# Patient Record
Sex: FEMALE | HISPANIC OR LATINO | Employment: FULL TIME | URBAN - NONMETROPOLITAN AREA
[De-identification: names, ages, dates, MRNs, and addresses within clinical notes are randomized per-mention and may not be internally consistent; named-entity substitution may affect disease eponyms.]

---

## 2017-01-05 ENCOUNTER — OFFICE VISIT (OUTPATIENT)
Dept: OTOLARYNGOLOGY | Facility: CLINIC | Age: 27
End: 2017-01-05

## 2017-01-05 VITALS
HEIGHT: 63 IN | DIASTOLIC BLOOD PRESSURE: 63 MMHG | TEMPERATURE: 98 F | RESPIRATION RATE: 20 BRPM | SYSTOLIC BLOOD PRESSURE: 145 MMHG | HEART RATE: 77 BPM | BODY MASS INDEX: 19.84 KG/M2 | WEIGHT: 112 LBS

## 2017-01-05 DIAGNOSIS — H69.83 ETD (EUSTACHIAN TUBE DYSFUNCTION), BILATERAL: Primary | ICD-10-CM

## 2017-01-05 DIAGNOSIS — J30.9 ALLERGIC RHINITIS, UNSPECIFIED ALLERGIC RHINITIS TRIGGER, UNSPECIFIED RHINITIS SEASONALITY: ICD-10-CM

## 2017-01-05 PROCEDURE — 92567 TYMPANOMETRY: CPT | Performed by: NURSE PRACTITIONER

## 2017-01-05 PROCEDURE — 99214 OFFICE O/P EST MOD 30 MIN: CPT | Performed by: NURSE PRACTITIONER

## 2017-01-05 RX ORDER — AZELASTINE 1 MG/ML
2 SPRAY, METERED NASAL 2 TIMES DAILY
COMMUNITY

## 2017-01-05 RX ORDER — FLUTICASONE PROPIONATE 50 MCG
2 SPRAY, SUSPENSION (ML) NASAL DAILY
COMMUNITY

## 2017-01-05 NOTE — PROGRESS NOTES
PRIMARY CARE PROVIDER: CORBY Hall  REFERRING PROVIDER: No ref. provider found    Chief Complaint   Patient presents with   • Ear Problem     ear fullness, pressure       Subjective   History of Present Illness:  Maura Wang is a  26 y.o.  female who is here for follow-up from complaints of ear fullness, ear pressure, popping and cracking of the ear, fluid on the ear, a history of ear tube placement and feeling like she is talking in a barrel. The symptoms are localized to the bilateral ears. The patient has had resolved symptoms. The symptoms have been resolved for the last several weeks. The symptoms are aggravated by  a recent upper respiratory infection. The symptoms are improved by Flonase and Astelin. She has a history of set of PE tubes placed as a child. She denies frequent ear infections, otalgia, otorrhea, dizziness, vertigo, tinnitus, or hearing loss.     Review of Systems:  Review of Systems   Constitutional: Negative for chills, fatigue and fever.   HENT: Positive for rhinorrhea. Negative for congestion, ear discharge, ear pain, hearing loss, sinus pressure, tinnitus and voice change.    All other systems reviewed and are negative.      Past History:  Past Medical History   Diagnosis Date   • Ear fullness      Past Surgical History   Procedure Laterality Date   • Hernia repair     • Tonsillectomy     • Ear tubes       History reviewed. No pertinent family history.  Social History   Substance Use Topics   • Smoking status: Never Smoker   • Smokeless tobacco: None   • Alcohol use Yes      Comment: SOCIAL       Current Outpatient Prescriptions:   •  azelastine (ASTELIN) 0.1 % nasal spray, 2 sprays into each nostril 2 (Two) Times a Day. Use in each nostril as directed, Disp: , Rfl:   •  fluticasone (FLONASE) 50 MCG/ACT nasal spray, 2 sprays into each nostril Daily., Disp: , Rfl:   Allergies:  Review of patient's allergies indicates no known allergies.    Objective     Vital Signs:     Visit  "Vitals   • /63   • Pulse 77   • Temp 98 °F (36.7 °C)   • Resp 20   • Ht 63\" (160 cm)   • Wt 112 lb (50.8 kg)   • BMI 19.84 kg/m2         Physical Exam:  Physical Exam   Constitutional: She is oriented to person, place, and time. She appears well-developed and well-nourished. She is cooperative. No distress.   HENT:   Head: Normocephalic and atraumatic.   Right Ear: Hearing, external ear and ear canal normal. No drainage. Tympanic membrane is scarred. No middle ear effusion.   Left Ear: Hearing, external ear and ear canal normal. No drainage. Tympanic membrane is scarred.  No middle ear effusion.   Nose: Mucosal edema (mild) and rhinorrhea (clear) present. No nasal deformity.   Mouth/Throat: Uvula is midline, oropharynx is clear and moist and mucous membranes are normal. Tonsils are 0 on the right. Tonsils are 0 on the left.   Type A tymp bilaterally   Eyes: Conjunctivae, EOM and lids are normal. Pupils are equal, round, and reactive to light.   Neck: Phonation normal. Neck supple.   Pulmonary/Chest: Effort normal. No stridor. No respiratory distress.   Lymphadenopathy:     She has no cervical adenopathy.   Neurological: She is alert and oriented to person, place, and time. She has normal strength. No cranial nerve deficit.   Skin: Skin is warm, dry and intact. No lesion and no rash noted.   Psychiatric: She has a normal mood and affect. Her speech is normal and behavior is normal. Judgment and thought content normal.       Assessment   Assessment:  1. ETD (eustachian tube dysfunction), bilateral    2. Allergic rhinitis, unspecified allergic rhinitis trigger, unspecified rhinitis seasonality        Plan   Plan:    No orders of the defined types were placed in this encounter.    Patient Instructions   Continue current regimen. Call for ear drainage, ear pain, fever over 101, or hearing loss. Call for problems or worsening symptoms.     Return in about 6 months (around 7/5/2017), or if symptoms worsen or fail to " improve, for Recheck.    My findings and recommendations were discussed and questions were answered.

## 2017-01-05 NOTE — PATIENT INSTRUCTIONS
Continue current regimen. Call for ear drainage, ear pain, fever over 101, or hearing loss. Call for problems or worsening symptoms.

## 2017-01-05 NOTE — MR AVS SNAPSHOT
Maura Wang   2017 9:00 AM   Office Visit    Dept Phone:  [phone]   Encounter #:  [id number]    Provider:  CORBY Babin   Department:  [facility]                Your Full Care Plan              Your Updated Medication List          This list is accurate as of: 17 10:22 AM.  Always use your most recent med list.                azelastine 0.1 % nasal spray   Commonly known as:  ASTELIN       fluticasone 50 MCG/ACT nasal spray   Commonly known as:  FLONASE               Instructions     None    Patient Instructions History      Upcoming Appointments     Visit Type Date Time Department    FOLLOW UP 2017  9:00 AM [facility] [place]    FOLLOW UP 2017  9:00 AM [facility] [place]      [employer]     Our records indicate that you have an active [facility] [employer] account.    You can view your After Visit Summary by going to [employer] and logging in with your [employer] username and password.  If you don't have a [employer] username and password but a parent or guardian has access to your record, the parent or guardian should login with their own [employer] username and password and access your record to view the After Visit Summary.    If you have questions, you can email [email or web address] or call [phone] to talk to our [employer] staff.  Remember, [employer] is NOT to be used for urgent needs.  For medical emergencies, dial 911.               Other Info from Your Visit           Your Appointments     2017  9:00 AM CDT   Follow Up with CORBY Babin   [facility] (--)    [address] [place] [address]   [address]  [facility] [id number] [phone]           Arrive 15 minutes prior to appointment.              Allergies     No Known Allergies      Reason for Visit     Ear Problem EAR FULLNESS / PATIENT IS DOING WELL NO PROBLEMS NASAL SPRAY ARE WORKING AND HELPING.      Vital Signs     Blood Pressure Pulse  "Temperature Respirations Height Weight    145/63 77 98 °F (36.7 °C) 20 63\" (160 cm) 112 lb (50.8 kg)    Body Mass Index Smoking Status                19.84 kg/m2 Never Smoker            "